# Patient Record
Sex: FEMALE | Race: WHITE | NOT HISPANIC OR LATINO | URBAN - METROPOLITAN AREA
[De-identification: names, ages, dates, MRNs, and addresses within clinical notes are randomized per-mention and may not be internally consistent; named-entity substitution may affect disease eponyms.]

---

## 2017-12-12 ENCOUNTER — TELEPHONE (OUTPATIENT)
Dept: NEPHROLOGY | Facility: CLINIC | Age: 72
End: 2017-12-12

## 2017-12-12 NOTE — TELEPHONE ENCOUNTER
----- Message from Shy Carson sent at 12/12/2017  9:01 AM CST -----  Contact:   Carson, , [phone]. Calling to speak with the Doctor regarding establishing care, but would like to speak with the doctor first. The Patients current provider is leaving. Patient does prefer [facility]. Please advise. Thanks.

## 2017-12-12 NOTE — TELEPHONE ENCOUNTER
"Was considering a transfer of care to Dr. Strickland once they are out of hospital, since they "have to find someone else anyway."  Advised Dr. Strickland only rounds at [facility] under specific circumstances when covering for specific doctors.  They will call back for appt once discharge.      "

## 2018-03-07 PROBLEM — Z79.01 LONG TERM (CURRENT) USE OF ANTICOAGULANTS: Status: ACTIVE | Noted: 2018-03-07

## 2018-03-26 ENCOUNTER — TELEPHONE (OUTPATIENT)
Dept: NEPHROLOGY | Facility: CLINIC | Age: 73
End: 2018-03-26

## 2018-03-26 NOTE — TELEPHONE ENCOUNTER
"The phone call was dropped to the nurse station during clinic from ext [phone].      She met Dr. Strickland in the hospital a few years ago and upon discharge was [place] [facility] [place] care of Lilibeth Ingram.      She would like to change dialysis centers and would like to consider changing to Dr. Strickland.      She said she is new to this, and requested an appointment in the clinic this afternoon to discuss the recommendation of a new tunneled cath.  She said she would like to consider "the one in the belly" if she needed something permanent.      I explained I couldn't give her an appointment at this time, but would find out the appropriate action needed and get back with her.   "

## 2018-03-29 NOTE — TELEPHONE ENCOUNTER
There is a  at [facility] with whom the pt should speak about her concerns as well as ask to speak with the medical director about her concerns.    Thank you

## 2018-03-29 NOTE — TELEPHONE ENCOUNTER
Patient understands and will speak with the .  She reports she  wanted to follow with Dr. Strickland when she was discharged from hospital and feels like she wasn't given that option.

## 2018-04-02 ENCOUNTER — TELEPHONE (OUTPATIENT)
Dept: NEPHROLOGY | Facility: CLINIC | Age: 73
End: 2018-04-02

## 2018-04-02 NOTE — TELEPHONE ENCOUNTER
Patient states the  hasn't been there since she decided to change units.  The head of the unit said the  or the  can start the ball rolling.  She visited [facility].  She is trying to get transferred. I advised patient that she should talk with /medical director at [facility].  Patient voiced understanding, as it is my understanding this type of  Transfer will take place between units.       She is scheduled for peritoneal catheter  surgery with Alexander Braga and hopes to eventually be on PD.

## 2018-04-02 NOTE — TELEPHONE ENCOUNTER
----- Message from Ricarda Raman sent at 4/2/2018  3:40 PM CDT -----  Contact: Carson Leiva - spouse  Contact patient or her spouse regarding scheduling, patient is a dialysis patient and is requesting to change physicians , cotnact # [phone].    Thank you

## 2018-04-06 PROBLEM — I48.91 ATRIAL FIBRILLATION: Status: ACTIVE | Noted: 2018-04-06

## 2018-05-15 RX ORDER — CALCIUM ACETATE 667 MG/1
2001 CAPSULE ORAL
Qty: 270 CAPSULE | Refills: 11 | Status: SHIPPED | OUTPATIENT
Start: 2018-05-15 | End: 2018-06-06

## 2018-08-02 ENCOUNTER — TELEPHONE (OUTPATIENT)
Dept: NEPHROLOGY | Facility: CLINIC | Age: 73
End: 2018-08-02

## 2018-08-02 NOTE — TELEPHONE ENCOUNTER
COrrespondence recieved from [facility] and Dr. Deb Gil re: BS over 200 x 2-3 days.    Note on paper states patient or  was to discuss with nephrologist re:  Changing from 5% dextrose to another dialysiss fluid to see if this would help with glucose control.      [facility] fax [phone]      [facility] [phone]

## 2018-09-20 RX ORDER — CALCIUM ACETATE 667 MG/1
TABLET ORAL
Qty: 240 TABLET | Refills: 11 | Status: SHIPPED | OUTPATIENT
Start: 2018-09-20 | End: 2019-01-01

## 2019-01-01 RX ORDER — CALCIUM ACETATE 667 MG/1
CAPSULE ORAL
Qty: 500 CAPSULE | Refills: 11 | Status: ON HOLD | OUTPATIENT
Start: 2019-01-01 | End: 2019-06-18 | Stop reason: HOSPADM

## 2019-04-10 ENCOUNTER — TELEPHONE (OUTPATIENT)
Dept: NEPHROLOGY | Facility: CLINIC | Age: 74
End: 2019-04-10

## 2019-04-10 NOTE — TELEPHONE ENCOUNTER
----- Message from RT Angeles sent at 4/10/2019  2:53 PM CDT -----  Contact: Carson, ,[phone]   Carson, ,[phone], requesting medical documentation to be excused from attending jury duty, thanks.

## 2019-04-10 NOTE — LETTER
[facility]  [address] [facility] [address]  [facility] [address]  Phone: [phone] April 10, 2019    Julee Leiva  [address] [facility] [address]      To Whom It May Concern:    Reji Leiva  is unable to participate in jury duty at this time.  Not only is he the primary caregiver for his wife with end stage renal disease and is on dialysis, he is currently undergoing treatment for malignant melanoma.  If you have any questions or concerns, please feel free to call my office.    Sincerely,          Tanya Bryson LPN  for  Geoff Strickland MD

## 2019-04-11 NOTE — TELEPHONE ENCOUNTER
Spoke to pts.  he asked if you could please send it to his email.   [email or web address]     He also said to tell you thank you very much/

## 2019-06-05 PROBLEM — R06.03 ACUTE RESPIRATORY DISTRESS: Status: ACTIVE | Noted: 2019-06-05

## 2019-06-05 PROBLEM — R00.0 TACHYCARDIA: Status: ACTIVE | Noted: 2019-06-05

## 2019-06-05 PROBLEM — N18.6 ESRD ON HEMODIALYSIS: Status: ACTIVE | Noted: 2019-06-05

## 2019-06-05 PROBLEM — R91.8 PULMONARY INFILTRATE: Status: ACTIVE | Noted: 2019-06-05

## 2019-06-05 PROBLEM — E11.9 TYPE 2 DIABETES MELLITUS: Status: ACTIVE | Noted: 2019-06-05

## 2019-06-05 PROBLEM — R06.02 SOB (SHORTNESS OF BREATH): Status: ACTIVE | Noted: 2019-06-05

## 2019-06-05 PROBLEM — I10 HTN (HYPERTENSION): Status: ACTIVE | Noted: 2019-06-05

## 2019-06-05 PROBLEM — G93.40 ACUTE ENCEPHALOPATHY: Status: ACTIVE | Noted: 2019-06-05

## 2019-06-05 PROBLEM — D63.1 ANEMIA DUE TO CHRONIC KIDNEY DISEASE: Status: ACTIVE | Noted: 2019-06-05

## 2019-06-05 PROBLEM — N18.9 ANEMIA DUE TO CHRONIC KIDNEY DISEASE: Status: ACTIVE | Noted: 2019-06-05

## 2019-06-05 PROBLEM — Z99.2 ESRD ON HEMODIALYSIS: Status: ACTIVE | Noted: 2019-06-05

## 2019-06-07 PROBLEM — I48.0 PAF (PAROXYSMAL ATRIAL FIBRILLATION): Status: ACTIVE | Noted: 2018-04-06

## 2019-06-09 PROBLEM — I48.91 ATRIAL FIBRILLATION WITH RVR: Status: ACTIVE | Noted: 2019-06-05

## 2019-06-10 PROBLEM — E87.6 HYPOKALEMIA: Status: ACTIVE | Noted: 2019-06-10

## 2019-06-16 PROBLEM — G93.40 ACUTE ENCEPHALOPATHY: Status: RESOLVED | Noted: 2019-06-05 | Resolved: 2019-06-16

## 2019-06-16 PROBLEM — Z75.8 DISCHARGE PLANNING ISSUES: Status: ACTIVE | Noted: 2019-06-16

## 2019-08-05 ENCOUNTER — TELEPHONE (OUTPATIENT)
Dept: NEPHROLOGY | Facility: CLINIC | Age: 74
End: 2019-08-05

## 2019-08-05 DIAGNOSIS — Z76.89 ENCOUNTER TO ESTABLISH CARE: ICD-10-CM

## 2019-08-05 DIAGNOSIS — I48.0 PAF (PAROXYSMAL ATRIAL FIBRILLATION): Primary | ICD-10-CM

## 2019-08-05 NOTE — TELEPHONE ENCOUNTER
1.  Needs Handicap form signed for parking.     2.  She has jury duty in 2 weeks.  Needs form or letter. HD shift:  [facility] 10a-2p    Carson Leiva [phone]    Daughter understands Dr Strickland is out and might be a few days.     Parking form completed as much as possible and placed in MD box for review.

## 2019-08-05 NOTE — TELEPHONE ENCOUNTER
1. I will write a jury duty letter and expect it will be signed and ready on Wednesday, Friday the latest.  2. Same time frame on handicap parking      I had received a previous request for a female PCP referral at [facility] [place] as well as a female Cardiologist. There are no female Cardiologists in [place] or [place]:  1. I will place referral order to Cardiology, as first available. Reason is PAF and establish care   2. Please provide a list of all female PCPs in [place]--I think they are all excellent. She does not need a referral order for a PCP    Thank you

## 2019-08-06 ENCOUNTER — TELEPHONE (OUTPATIENT)
Dept: NEPHROLOGY | Facility: CLINIC | Age: 74
End: 2019-08-06

## 2019-08-06 NOTE — TELEPHONE ENCOUNTER
----- Message from Letty Miramontes sent at 8/6/2019  1:04 PM CDT -----  Contact:  Reji  Type:  Patient Returning Call    Who Called:  Reji  Who Left Message for Patient:  Tanya  Does the patient know what this is regarding?:  referrals  Best Call Back Number:  [phone]  Additional Information:  na

## 2019-08-06 NOTE — TELEPHONE ENCOUNTER
Left message on identified (Carson Leiva) voicemail with all this information.     I also spoke with him after and he will call back to set appointments.

## 2019-08-09 ENCOUNTER — PATIENT OUTREACH (OUTPATIENT)
Dept: ADMINISTRATIVE | Facility: HOSPITAL | Age: 74
End: 2019-08-09

## 2019-08-09 NOTE — LETTER
August 9, 2019    Julee Leiva  [address] [facility] [address]             [facility]  [address]  [facility] [address]  Phone: [phone] Dear Mrs. Leiva:    [facility] is committed to your overall health.  To help you get the most out of each of your visits, we will review your information to make sure you are up to date on all of your recommended tests and/or procedures.      Deb Gil, PhD  has found that your chart shows you may be due for the following:    Health Maintenance Due   Topic    Foot Exam     Eye Exam     TETANUS VACCINE     DEXA SCAN     Colonoscopy     Pneumococcal Vaccine (65+ Low/Medium Risk) (1 of 2 - PCV13)       If you have had any of the above done at another facility, please bring the records with you or FAX them to [phone] so that your record at [facility] will be complete.  If you have not had any of these tests or procedures done recently and would like to complete this testing, please call [phone] or send a message through your [facility] portal to your provider's office.    If you have an upcoming scheduled appointment for the above test and/or procedures, please disregard this letter.      If you have any questions or concerns, please don't hesitate to call.    Sincerely,    Martha Rutherford M.A. Panel Care Coordinator  [address] [place] [address]  [phone] (p)  [phone] (f)

## 2019-08-28 ENCOUNTER — OFFICE VISIT (OUTPATIENT)
Dept: CARDIOLOGY | Facility: CLINIC | Age: 74
End: 2019-08-28
Payer: MEDICARE

## 2019-08-28 VITALS
HEIGHT: 65 IN | DIASTOLIC BLOOD PRESSURE: 67 MMHG | BODY MASS INDEX: 28.32 KG/M2 | SYSTOLIC BLOOD PRESSURE: 132 MMHG | WEIGHT: 170 LBS | HEART RATE: 62 BPM

## 2019-08-28 DIAGNOSIS — E78.2 MIXED HYPERLIPIDEMIA: ICD-10-CM

## 2019-08-28 DIAGNOSIS — I10 ESSENTIAL HYPERTENSION: ICD-10-CM

## 2019-08-28 DIAGNOSIS — I48.0 PAF (PAROXYSMAL ATRIAL FIBRILLATION): Primary | ICD-10-CM

## 2019-08-28 PROBLEM — I48.91 ATRIAL FIBRILLATION WITH RVR: Status: RESOLVED | Noted: 2019-06-05 | Resolved: 2019-08-28

## 2019-08-28 PROCEDURE — 99214 PR OFFICE/OUTPT VISIT, EST, LEVL IV, 30-39 MIN: ICD-10-PCS | Mod: S$PBB,ICN,, | Performed by: INTERNAL MEDICINE

## 2019-08-28 PROCEDURE — 99999 PR PBB SHADOW E&M-EST. PATIENT-LVL III: ICD-10-PCS | Mod: PBBFAC,,, | Performed by: INTERNAL MEDICINE

## 2019-08-28 PROCEDURE — 99213 OFFICE O/P EST LOW 20 MIN: CPT | Mod: PBBFAC,PO | Performed by: INTERNAL MEDICINE

## 2019-08-28 PROCEDURE — 99214 OFFICE O/P EST MOD 30 MIN: CPT | Mod: S$PBB,ICN,, | Performed by: INTERNAL MEDICINE

## 2019-08-28 PROCEDURE — 99999 PR PBB SHADOW E&M-EST. PATIENT-LVL III: CPT | Mod: PBBFAC,,, | Performed by: INTERNAL MEDICINE

## 2019-08-28 RX ORDER — CITALOPRAM 10 MG/1
10 TABLET ORAL DAILY
Refills: 5 | COMMUNITY
Start: 2019-08-02

## 2019-08-28 RX ORDER — ASPIRIN 81 MG/1
81 TABLET ORAL
COMMUNITY

## 2019-08-28 NOTE — LETTER
August 28, 2019      Geoff Strickland MD  [address]  [address]  [facility] [address]           [facility]  [address] [place] [address]  Phone: [phone]          Patient: Julee Leiva   MR Number: [id number]   YOB: 1945   Date of Visit: 8/28/2019       Dear Dr. Geoff Strickland:    Thank you for referring Julee Leiva to me for evaluation. Attached you will find relevant portions of my assessment and plan of care.    If you have questions, please do not hesitate to call me. I look forward to following Julee Leiva along with you.    Sincerely,    Fran Sutherland MD    Enclosure  CC:  No Recipients    If you would like to receive this communication electronically, please contact [email or web address] or [phone] to request more information on [facility] [employer] access.    For providers and/or their staff who would like to refer a patient to [facility], please contact us through our one-stop-shop provider referral line, [facility], at [phone].    If you feel you have received this communication in error or would no longer like to receive these types of communications, please e-mail [email or web address]

## 2019-08-28 NOTE — PROGRESS NOTES
Subjective:    Patient ID:  Julee Leiva is a 74 y.o. female who presents for evaluation of Consult (ref from Dr Freeman)      Problem List Items Addressed This Visit        Cardiac/Vascular    Essential hypertension    Mixed hyperlipidemia    PAF (paroxysmal atrial fibrillation) - Primary          HPI    Referred by Dr. Strickland    Patient was recently admitted to [facility] from 06/05/2019 through 06/20/2019 for acute respiratory failure during which time she developed atrial fibrillation with RVR that resolved with Cardizem drip and AV gael agents.  She was started on Eliquis at discharge.  She presents today for outpatient Cardiology evaluation.    The patient states that she is having no cardiac symptoms at this time.  Went to dialysis yesterday  Has been on dialysis for about a year and a half, tolerating it ok.    No AFib history previous to her hospitalization.    No reported personal history of heart attack or stroke    Past Medical History:   Diagnosis Date    A-fib     Anticoagulant long-term use     Diabetes mellitus     Dialysis patient 02/2018    Hypertension        Past Surgical History:   Procedure Laterality Date    BREAST BIOPSY Left     X 2 - Both Negative       History reviewed. No pertinent family history.    Social History     Socioeconomic History    Marital status:      Spouse name: Not on file    Number of children: Not on file    Years of education: Not on file    Highest education level: Not on file   Occupational History    Not on file   Social Needs    Financial resource strain: Not on file    Food insecurity:     Worry: Not on file     Inability: Not on file    Transportation needs:     Medical: Not on file     Non-medical: Not on file   Tobacco Use    Smoking status: Never Smoker   Substance and Sexual Activity    Alcohol use: Never     Frequency: Never    Drug use: Not on file    Sexual activity: Not on file   Lifestyle    Physical  "activity:     Days per week: Not on file     Minutes per session: Not on file    Stress: Not on file   Relationships    Social connections:     Talks on phone: Not on file     Gets together: Not on file     Attends Scientologist service: Not on file     Active member of club or organization: Not on file     Attends meetings of clubs or organizations: Not on file     Relationship status: Not on file   Other Topics Concern    Not on file   Social History Narrative    Not on file       Review of patient's allergies indicates:   Allergen Reactions    Byetta [exenatide]     Codeine     Zofran [ondansetron hcl (pf)]      Per patient       Review of Systems   Constitution: Negative for decreased appetite, fever and malaise/fatigue.   Eyes: Negative for blurred vision.   Cardiovascular: Negative for chest pain, dyspnea on exertion, irregular heartbeat and leg swelling.   Respiratory: Negative for cough, hemoptysis, shortness of breath and wheezing.    Endocrine: Negative for cold intolerance and heat intolerance.   Hematologic/Lymphatic: Negative for bleeding problem.   Musculoskeletal: Negative for muscle weakness and myalgias.   Gastrointestinal: Negative for abdominal pain, constipation and diarrhea.   Genitourinary: Negative for bladder incontinence.   Neurological: Negative for dizziness and weakness.   Psychiatric/Behavioral: Negative for depression.        Objective:     Vitals:    08/28/19 1026   BP: 132/67   BP Location: Left arm   Patient Position: Sitting   BP Method: Medium (Automatic)   Pulse: 62   Weight: 77.1 kg (170 lb)   Height: 5' 5" (1.651 m)        Physical Exam   Constitutional: She is oriented to person, place, and time. She appears well-developed and well-nourished. No distress.   HENT:   Head: Normocephalic and atraumatic.   Neck: Neck supple. No JVD present.   Cardiovascular: Normal rate, regular rhythm and normal heart sounds. Exam reveals no gallop and no friction rub.   No murmur " heard.  Pulmonary/Chest: Effort normal. No respiratory distress. She has no wheezes. She has rales (Rales in bilateral bases).   Abdominal: Soft. Bowel sounds are normal. There is no tenderness. There is no rebound and no guarding.   Musculoskeletal: She exhibits edema (1-2+ BLE edema). She exhibits no tenderness.   Neurological: She is alert and oriented to person, place, and time.   Skin: Skin is warm and dry.   Psychiatric: Her behavior is normal.           Current Outpatient Medications on File Prior to Visit   Medication Sig    albuterol-ipratropium (DUO-NEB) 2.5 mg-0.5 mg/3 mL nebulizer solution Take 3 mLs by nebulization every 6 (six) hours as needed for Wheezing. Rescue    amLODIPine (NORVASC) 10 MG tablet Take 10 mg by mouth once daily.    apixaban (ELIQUIS) 5 mg Tab Take 1 tablet (5 mg total) by mouth 2 (two) times daily.    aspirin (ECOTRIN) 81 MG EC tablet Take 81 mg by mouth.    atorvastatin (LIPITOR) 40 MG tablet 1 tablet (40 mg total) by Per G Tube route once daily.    b complex vitamins tablet Take 1 tablet by mouth once daily.    carvedilol (COREG) 25 MG tablet Take 1 tablet (25 mg total) by mouth 2 (two) times daily.    citalopram (CELEXA) 10 MG tablet TK 1 T PO QPM    DULoxetine (CYMBALTA) 30 MG capsule Take 1 capsule (30 mg total) by mouth every other day. For 30 days then every 2 days for 2 weeks then every 3 days for 2 weeks then every 4 days for 2 weeks then every 5 days for 2 weeks then stop    fish oil-omega-3 fatty acids 300-1,000 mg capsule Take 1 capsule by mouth once daily.    glucose 4 GM chewable tablet Take 4 tablets (16 g total) by mouth as needed.    hydrALAZINE (APRESOLINE) 100 MG tablet Take 1 tablet (100 mg total) by mouth 3 (three) times daily.    insulin aspart U-100 (NOVOLOG) 100 unit/mL (3 mL) InPn pen Inject 0-5 Units into the skin before meals and at bedtime as needed (Hyperglycemia).    insulin detemir U-100 (LEVEMIR FLEXTOUCH) 100 unit/mL (3 mL) SubQ InPn  pen Inject 34 Units into the skin every evening.    ipratropium (ATROVENT HFA) 17 mcg/actuation inhaler Inhale 2 puffs into the lungs every 6 (six) hours. Rescue    L.acidophil,parac-S.therm-Bif. (RISAQUAD) Cap capsule Take 1 capsule by mouth once daily.    methylcellulose oral powder Take 3.4 g by mouth once daily.    pantoprazole (PROTONIX) 40 MG tablet Take 1 tablet (40 mg total) by mouth once daily.    sevelamer carbonate (RENVELA) 800 mg Tab Take 1 tablet (800 mg total) by mouth 3 (three) times daily with meals.    vitamin D (VITAMIN D3) 1000 units Tab Take 1 tablet (1,000 Units total) by mouth every other day.     No current facility-administered medications on file prior to visit.        Lipid Panel:   Lab Results   Component Value Date    CHOL 133 06/06/2019    HDL 37 (L) 06/06/2019    LDLCALC 46.4 (L) 06/06/2019    TRIG 248 (H) 06/06/2019    CHOLHDL 27.8 06/06/2019         The ASCVD Risk score ([place] GURDEEP Jr., et al., 2013) failed to calculate for the following reasons:    Unable to determine if patient is Non-     All pertinent labs, imaging, and EKGs reviewed.    Assessment:       1. PAF (paroxysmal atrial fibrillation)    2. Essential hypertension    3. Mixed hyperlipidemia         Plan:     BP/Pulse good today  Evidence of volume on exam with some new O2 dependence which is the thing that annoys her the most  Regular rhythm per exam today  No symptomatic evidence of recurrence of aFib    Consider extra fluid removal with dialysis if deemed appropriate with Nephrology  Continue Eliquis 5mg PO BID  Will need to reduce Eliquis to 2.5 mg PO BID once patient turns 79  Continue beta-blocker at this time  Consider event monitor/loop recorder in the future    Continue other cardiac medications  Mediterranean Diet/Cardiovascular Exercise Program    Patient queried and all questions were answered.    F/u in 3 months to reassess fluid      Signed:    Fran Sutherland MD  8/28/2019 8:49  AM

## 2019-09-06 ENCOUNTER — TELEPHONE (OUTPATIENT)
Dept: NEPHROLOGY | Facility: CLINIC | Age: 74
End: 2019-09-06

## 2019-09-06 NOTE — TELEPHONE ENCOUNTER
Mr Carson called to say they would like to change pulmonologist to [facility] for continuity of care.  She currently sees Dr Kelsi Moreno and they love her, but have concerns about access to care, particularly re: coverage when she is out of the office.      Do you have a recommendation?    They saw Dr Barrera and have follow up scheduled.      I told him will call with your recommendation.

## 2019-09-09 NOTE — TELEPHONE ENCOUNTER
There are several good pulmonologists that have priviliges at [facility] and have access to Epic at their office, even though their office does not use Epic for clinic appointments. I would suggest Dr. Morales for Ms. Leiva. Thank you

## 2019-09-09 NOTE — TELEPHONE ENCOUNTER
Spoke with spouse.  He told me he realized she had seen Dr Pollock in hospital and they had decided to schedule with him, before I called them back.     Told Carson to let me know how I can help.

## 2019-09-11 ENCOUNTER — TELEPHONE (OUTPATIENT)
Dept: CARDIOLOGY | Facility: CLINIC | Age: 74
End: 2019-09-11

## 2019-09-11 NOTE — TELEPHONE ENCOUNTER
----- Message from Ricarda Raman sent at 9/11/2019  9:19 AM CDT -----  Type: Needs Medical Advice    Who Called:  Kary with [facility]  Best Call Back Number: [phone] ask america Malhotra  Additional Information: Veronica  in both lungs, blood pressure 130/72 heart reate 73, has a productive cough of white foamy  Flem, please contact to advise.     Thank you

## 2019-09-11 NOTE — TELEPHONE ENCOUNTER
Spoke to Marya harris [facility]; advised to contact nephrologist for emergent dialysis per Dr Sutherland; if pt worsens pt is to go to the ER. Marya expressed understanding

## 2019-09-15 RX ORDER — GENTAMICIN SULFATE 1 MG/G
OINTMENT TOPICAL
Qty: 15 G | Refills: 11 | Status: SHIPPED | OUTPATIENT
Start: 2019-09-15 | End: 2019-10-09

## 2019-09-17 ENCOUNTER — TELEPHONE (OUTPATIENT)
Dept: NEPHROLOGY | Facility: CLINIC | Age: 74
End: 2019-09-17

## 2019-09-17 NOTE — TELEPHONE ENCOUNTER
Carson got a missed call and a voice mail from you.  She [facility] to  [facility]  From about [phone].  He is trying to set up a meeting time.

## 2019-09-17 NOTE — TELEPHONE ENCOUNTER
Left message on identified voicemail that Dr Strickland should be calling him later this evening to talk.

## 2019-09-17 NOTE — TELEPHONE ENCOUNTER
I am aware when she has dialysis but I am in clinic at that time, which is why I have been missing her. But I need to speak with him first about PD. I will call him later on today. Please let him know. Thank you

## 2019-09-25 ENCOUNTER — HOSPITAL ENCOUNTER (OUTPATIENT)
Dept: RADIOLOGY | Facility: HOSPITAL | Age: 74
Discharge: HOME | End: 2019-09-25
Attending: NURSE PRACTITIONER
Payer: MEDICARE

## 2019-09-25 DIAGNOSIS — J90 EXUDATIVE PLEURISY: ICD-10-CM

## 2019-09-25 DIAGNOSIS — J98.19 COLLAPSE OF LUNG: ICD-10-CM

## 2019-09-25 PROCEDURE — 71250 CT CHEST WITHOUT CONTRAST: ICD-10-PCS | Mod: 26,,, | Performed by: RADIOLOGY

## 2019-09-25 PROCEDURE — 71250 CT THORAX DX C-: CPT | Mod: 26,,, | Performed by: RADIOLOGY

## 2019-09-25 PROCEDURE — 71250 CT THORAX DX C-: CPT | Mod: TC,PO

## 2019-10-08 ENCOUNTER — TELEPHONE (OUTPATIENT)
Dept: CARDIOLOGY | Facility: CLINIC | Age: 74
End: 2019-10-08

## 2019-10-08 NOTE — TELEPHONE ENCOUNTER
----- Message from Omer Weiss sent at 10/8/2019  9:34 AM CDT -----  Type: Needs Medical Advice    Who Called:  [facility]    Best Call Back Number:  [phone]  Additional Information: Caller states that she would like a callback regarding the status of a fax for the patient's surgical clearance and being off of Eliquis  Fax was sent on 10/04

## 2019-10-09 PROBLEM — J90 PLEURAL EFFUSION: Status: ACTIVE | Noted: 2019-10-09

## 2019-10-10 PROBLEM — D63.1 ANEMIA OF RENAL DISEASE: Status: ACTIVE | Noted: 2019-10-10

## 2019-10-10 PROBLEM — N18.9 ANEMIA OF RENAL DISEASE: Status: ACTIVE | Noted: 2019-10-10

## 2019-10-11 PROBLEM — J96.01 ACUTE HYPOXEMIC RESPIRATORY FAILURE: Status: ACTIVE | Noted: 2019-10-11

## 2019-10-11 PROBLEM — I50.32 CHRONIC DIASTOLIC HEART FAILURE: Status: ACTIVE | Noted: 2019-10-11

## 2019-10-13 PROBLEM — N18.9 ANEMIA DUE TO CHRONIC KIDNEY DISEASE: Status: RESOLVED | Noted: 2019-06-05 | Resolved: 2019-10-13

## 2019-10-13 PROBLEM — D63.1 ANEMIA DUE TO CHRONIC KIDNEY DISEASE: Status: RESOLVED | Noted: 2019-06-05 | Resolved: 2019-10-13

## 2019-10-13 PROBLEM — I25.10 CAD (CORONARY ARTERY DISEASE): Status: ACTIVE | Noted: 2019-10-13

## 2019-10-18 PROBLEM — Z99.2 ANEMIA DUE TO CHRONIC KIDNEY DISEASE, ON CHRONIC DIALYSIS: Status: ACTIVE | Noted: 2019-06-05

## 2019-10-18 PROBLEM — N18.6 ANEMIA DUE TO CHRONIC KIDNEY DISEASE, ON CHRONIC DIALYSIS: Status: ACTIVE | Noted: 2019-06-05

## 2019-10-28 ENCOUNTER — TELEPHONE (OUTPATIENT)
Dept: NEPHROLOGY | Facility: CLINIC | Age: 74
End: 2019-10-28

## 2019-10-28 NOTE — TELEPHONE ENCOUNTER
Spoke with hospice admissions specialist and answered questions per Dr Clement. She will let me know if I can help further.

## 2019-10-28 NOTE — TELEPHONE ENCOUNTER
----- Message from Omer Weiss sent at 10/28/2019 11:02 AM CDT -----  Type: Needs Medical Advice  URGENT    Who Called:  Natalie/[facility]    Best Call Back Number: [phone]  Additional Information: Caller states that she would like a callback regarding the patient's eligibility for hospice with dialysis.

## 2021-04-28 NOTE — PROGRESS NOTES
Chart review completed 08/09/2019   How Severe Are Your Spot(S)?: moderate What Is The Reason For Today's Visit?: Upper Body Skin Exam Detail Level: Simple Detail Level: Generalized Detail Level: Detailed